# Patient Record
Sex: MALE | Race: WHITE | NOT HISPANIC OR LATINO | Employment: PART TIME | URBAN - METROPOLITAN AREA
[De-identification: names, ages, dates, MRNs, and addresses within clinical notes are randomized per-mention and may not be internally consistent; named-entity substitution may affect disease eponyms.]

---

## 2024-12-07 ENCOUNTER — APPOINTMENT (OUTPATIENT)
Dept: URGENT CARE | Age: 19
End: 2024-12-07
Payer: MEDICAID

## 2024-12-07 ENCOUNTER — OFFICE VISIT (OUTPATIENT)
Dept: URGENT CARE | Age: 19
End: 2024-12-07
Payer: MEDICAID

## 2024-12-07 VITALS
HEIGHT: 76 IN | WEIGHT: 200 LBS | HEART RATE: 95 BPM | SYSTOLIC BLOOD PRESSURE: 119 MMHG | OXYGEN SATURATION: 97 % | BODY MASS INDEX: 24.36 KG/M2 | TEMPERATURE: 102.5 F | RESPIRATION RATE: 18 BRPM | DIASTOLIC BLOOD PRESSURE: 66 MMHG

## 2024-12-07 DIAGNOSIS — J01.10 ACUTE FRONTAL SINUSITIS, RECURRENCE NOT SPECIFIED: Primary | ICD-10-CM

## 2024-12-07 DIAGNOSIS — R69 ILLNESS: ICD-10-CM

## 2024-12-07 LAB
POC RAPID INFLUENZA A: NEGATIVE
POC RAPID INFLUENZA B: NEGATIVE
POC SARS-COV-2 AG BINAX: NORMAL

## 2024-12-07 RX ORDER — CEFDINIR 300 MG/1
300 CAPSULE ORAL 2 TIMES DAILY
Qty: 20 CAPSULE | Refills: 0 | Status: SHIPPED | OUTPATIENT
Start: 2024-12-07 | End: 2024-12-17

## 2024-12-07 ASSESSMENT — ENCOUNTER SYMPTOMS
SORE THROAT: 0
WHEEZING: 0
VOMITING: 1
SINUS PRESSURE: 1
SHORTNESS OF BREATH: 0
EYE DISCHARGE: 0
CHEST TIGHTNESS: 0
EYE PAIN: 0
COUGH: 0
CHILLS: 1
EYE REDNESS: 0
FEVER: 1
SINUS PAIN: 1
HEADACHES: 1
NAUSEA: 1

## 2024-12-07 NOTE — PROGRESS NOTES
Subjective   Patient ID: Tahir Ritter is a 19 y.o. male. They present today with a chief complaint of Illness (Going on x3 days ), Headache, Sinusitis, Vomiting, Fever, Earache, Chills, and Lethargy.    History of Present Illness    History provided by:  Patient   used: No    Illness  Location:  Frontal and Sphenoid sinuses  Quality:  Ache  Severity:  Severe (8/10)  Duration:  4 days  Timing:  Constant  Progression:  Worsening  Chronicity:  New  Relieved by:  Ibuprofen  Associated symptoms: ear pain, fever, headaches, nausea and vomiting    Associated symptoms: no chest pain, no congestion, no cough, no shortness of breath, no sore throat and no wheezing    Headache  Associated symptoms: ear pain, fever, nausea, sinus pressure and vomiting    Associated symptoms: no congestion, no cough, no eye pain and no sore throat    Sinusitis  Associated symptoms: chills, ear pain, fever, headaches, nausea and vomiting    Associated symptoms: no chest pain, no congestion, no cough, no shortness of breath, no sore throat and no wheezing    Vomiting  Associated symptoms: chills, fever and headaches    Associated symptoms: no cough and no sore throat    Fever   Associated symptoms include ear pain, headaches, nausea and vomiting. Pertinent negatives include no chest pain, congestion, coughing, sore throat or wheezing.   Earache   Associated symptoms include headaches and vomiting. Pertinent negatives include no coughing or sore throat.       Past Medical History  Allergies as of 12/07/2024    (No Known Allergies)       (Not in a hospital admission)       History reviewed. No pertinent past medical history.    History reviewed. No pertinent surgical history.     reports that he has never smoked. He has never used smokeless tobacco.    Review of Systems  Review of Systems   Constitutional:  Positive for chills and fever.   HENT:  Positive for ear pain, sinus pressure and sinus pain. Negative for congestion and  "sore throat.         Ear blockage   Eyes:  Negative for pain, discharge and redness.   Respiratory:  Negative for cough, chest tightness, shortness of breath and wheezing.    Cardiovascular:  Negative for chest pain.   Gastrointestinal:  Positive for nausea and vomiting.   Neurological:  Positive for headaches.                                  Objective    Vitals:    12/07/24 1151   BP: 119/66   BP Location: Left arm   Patient Position: Sitting   Pulse: 95   Resp: 18   Temp: (!) 39.2 °C (102.5 °F)   TempSrc: Oral   SpO2: 97%   Weight: 90.7 kg (200 lb)   Height: 1.93 m (6' 4\")     No LMP for male patient.    Physical Exam  Vitals reviewed.   Constitutional:       General: He is not in acute distress.     Appearance: He is normal weight.   HENT:      Head: Atraumatic.      Right Ear: Tympanic membrane and ear canal normal.      Left Ear: Tympanic membrane and ear canal normal.      Nose:      Right Sinus: Frontal sinus tenderness present. No maxillary sinus tenderness.      Left Sinus: Frontal sinus tenderness present. No maxillary sinus tenderness.      Mouth/Throat:      Mouth: Mucous membranes are moist.      Pharynx: No posterior oropharyngeal erythema.   Eyes:      Extraocular Movements: Extraocular movements intact.      Pupils: Pupils are equal, round, and reactive to light.   Cardiovascular:      Rate and Rhythm: Normal rate and regular rhythm.      Heart sounds: No murmur heard.     No friction rub.   Pulmonary:      Effort: Pulmonary effort is normal. No respiratory distress.      Breath sounds: No wheezing, rhonchi or rales.   Musculoskeletal:      Cervical back: No rigidity or tenderness.   Lymphadenopathy:      Cervical: No cervical adenopathy.   Neurological:      Mental Status: He is alert.         Procedures    Point of Care Test & Imaging Results from this visit  Results for orders placed or performed in visit on 12/07/24   POCT Influenza A/B manually resulted   Result Value Ref Range    POC Rapid " Influenza A Negative Negative    POC Rapid Influenza B Negative Negative   POCT Covid-19 Rapid Antigen   Result Value Ref Range    POC MACRINA-COV-2 AG  Presumptive negative test for SARS-CoV-2 (no antigen detected)     Presumptive negative test for SARS-CoV-2 (no antigen detected)      No results found.    Diagnostic study results (if any) were reviewed by Alex Anton DO.    Assessment/Plan   Allergies, medications, history, and pertinent labs/EKGs/Imaging reviewed by Alex Anton DO.     Orders and Diagnoses  Diagnoses and all orders for this visit:  Illness  -     POCT Influenza A/B manually resulted  -     POCT Covid-19 Rapid Antigen      Medical Admin Record      Patient disposition: Home    Electronically signed by Alex Anton DO  12:02 PM

## 2025-05-12 ENCOUNTER — APPOINTMENT (OUTPATIENT)
Dept: RADIOLOGY | Facility: HOSPITAL | Age: 20
End: 2025-05-12
Payer: COMMERCIAL

## 2025-05-12 ENCOUNTER — HOSPITAL ENCOUNTER (EMERGENCY)
Facility: HOSPITAL | Age: 20
Discharge: HOME | End: 2025-05-12
Payer: COMMERCIAL

## 2025-05-12 VITALS
SYSTOLIC BLOOD PRESSURE: 155 MMHG | RESPIRATION RATE: 16 BRPM | OXYGEN SATURATION: 98 % | DIASTOLIC BLOOD PRESSURE: 93 MMHG | HEIGHT: 76 IN | BODY MASS INDEX: 23.75 KG/M2 | TEMPERATURE: 97.5 F | HEART RATE: 91 BPM | WEIGHT: 195 LBS
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
## 2025-05-12 PROCEDURE — 99284 EMERGENCY DEPT VISIT MOD MDM: CPT | Mod: 25

## 2025-05-12 PROCEDURE — 72125 CT NECK SPINE W/O DYE: CPT

## 2025-05-12 PROCEDURE — 70450 CT HEAD/BRAIN W/O DYE: CPT

## 2025-05-12 PROCEDURE — 2500000004 HC RX 250 GENERAL PHARMACY W/ HCPCS (ALT 636 FOR OP/ED)

## 2025-05-12 RX ORDER — ONDANSETRON 4 MG/1
4 TABLET, FILM COATED ORAL EVERY 6 HOURS
Qty: 12 TABLET | Refills: 0 | Status: SHIPPED | OUTPATIENT
Start: 2025-05-12 | End: 2025-05-15

## 2025-05-12 RX ORDER — ONDANSETRON 4 MG/1
4 TABLET, ORALLY DISINTEGRATING ORAL ONCE
Status: COMPLETED | OUTPATIENT
Start: 2025-05-12 | End: 2025-05-12

## 2025-05-12 RX ORDER — ONDANSETRON 4 MG/1
4 TABLET, FILM COATED ORAL EVERY 6 HOURS
Qty: 12 TABLET | Refills: 0 | Status: SHIPPED | OUTPATIENT
Start: 2025-05-12 | End: 2025-05-12

## 2025-05-12 RX ADMIN — ONDANSETRON 4 MG: 4 TABLET, ORALLY DISINTEGRATING ORAL at 16:38

## 2025-05-12 ASSESSMENT — LIFESTYLE VARIABLES
TOTAL SCORE: 0
HAVE PEOPLE ANNOYED YOU BY CRITICIZING YOUR DRINKING: NO
EVER FELT BAD OR GUILTY ABOUT YOUR DRINKING: NO
HAVE YOU EVER FELT YOU SHOULD CUT DOWN ON YOUR DRINKING: NO
EVER HAD A DRINK FIRST THING IN THE MORNING TO STEADY YOUR NERVES TO GET RID OF A HANGOVER: NO

## 2025-05-12 ASSESSMENT — COLUMBIA-SUICIDE SEVERITY RATING SCALE - C-SSRS
6. HAVE YOU EVER DONE ANYTHING, STARTED TO DO ANYTHING, OR PREPARED TO DO ANYTHING TO END YOUR LIFE?: NO
2. HAVE YOU ACTUALLY HAD ANY THOUGHTS OF KILLING YOURSELF?: NO
1. IN THE PAST MONTH, HAVE YOU WISHED YOU WERE DEAD OR WISHED YOU COULD GO TO SLEEP AND NOT WAKE UP?: NO

## 2025-05-12 ASSESSMENT — PAIN - FUNCTIONAL ASSESSMENT: PAIN_FUNCTIONAL_ASSESSMENT: 0-10

## 2025-05-12 ASSESSMENT — PAIN SCALES - GENERAL: PAINLEVEL_OUTOF10: 4

## 2025-05-12 NOTE — ED PROVIDER NOTES
HPI   Chief Complaint   Patient presents with    Fall       19-year-old male no pertinent past medical history reports emergency department for chief complaint of fall that occurred 2 days ago.  Patient states that he was drinking alcohol and accidentally tripped outside hitting his head on the concrete.  Does not remember the incident.  Reports persistent headache, generalized fatigue as well as that his eyes are red.  Does report 1 episode of emesis after this happened.  Denies any anticoagulation use, back pain, paresthesias, loss of bowel or bladder, abdominal pain.  Denies any pertinent past medical history.              Patient History   Medical History[1]  Surgical History[2]  Family History[3]  Social History[4]    Physical Exam   ED Triage Vitals [05/12/25 1607]   Temperature Heart Rate Respirations BP   36.4 °C (97.5 °F) 91 16 (!) 155/93      Pulse Ox Temp src Heart Rate Source Patient Position   98 % -- -- --      BP Location FiO2 (%)     -- --       Physical Exam  Vitals and nursing note reviewed.   Constitutional:       General: He is not in acute distress.     Appearance: Normal appearance. He is normal weight. He is not ill-appearing, toxic-appearing or diaphoretic.   HENT:      Head: Normocephalic and atraumatic.      Comments: No TTP to the bilateral maxillary areas, jaw, nose.  No appreciable deformities.  No facial pain.     Mouth/Throat:      Mouth: Mucous membranes are moist.      Pharynx: Oropharynx is clear. No oropharyngeal exudate or posterior oropharyngeal erythema.   Eyes:      General:         Right eye: No discharge.         Left eye: No discharge.      Extraocular Movements: Extraocular movements intact.      Conjunctiva/sclera: Conjunctivae normal.      Pupils: Pupils are equal, round, and reactive to light.   Cardiovascular:      Rate and Rhythm: Normal rate and regular rhythm.      Heart sounds: Normal heart sounds. No murmur heard.     No friction rub. No gallop.   Pulmonary:       Effort: Pulmonary effort is normal. No respiratory distress.      Breath sounds: Normal breath sounds. No stridor. No wheezing, rhonchi or rales.   Musculoskeletal:      Comments: Well-healing abrasion to the left forehead area.  No obvious surrounding erythema, edema, purulence or signs of infectious process.    No T or L-spine tenderness in the midline regions on palpation.  Mild midline C-spine and bilateral paraspinal muscle tenderness to palpation.   Neurological:      General: No focal deficit present.      Mental Status: He is alert and oriented to person, place, and time.      Sensory: No sensory deficit.      Motor: No weakness.      Gait: Gait normal.           ED Course & MDM   Diagnoses as of 05/12/25 1747   Fall, initial encounter                 No data recorded     [place] Coma Scale Score: 15 (05/12/25 1605 : Clementine Aviles RN)                           Medical Decision Making  19-year-old male presents to pursue department status post fall that occurred a couple days ago.  Patient reports he was drinking alcohol and tripped outside of the concrete and hitting his head.  States he does not remember this incident very well.  Reports persistent headache since then.  No focal neurologic deficits on exam.  Patient overall well-appearing at this time.  Reassuring vital signs in the emergency department.  Patient on exam complains of headache as well as some mild midline C-spine tenderness.  With this in mind we will [place] imaging of these areas to rule out acute intracranial abnormality or osseous involvement.    5:48 PM CT of the head and C-spine both negative.  Advised patient of these findings here today.  Patient was given referral to concussion clinic for further management evaluation.  Also can prescriptions for antiemetics given patient's reported nausea.  Spoke with the patient regarding symptomatic management moving forward as well as signs symptoms of return.  Patient did have opportunity to  ask questions and admits within no further complaints at this time and was amenable plan moving forward for discharge.        Procedure  Procedures         [1] No past medical history on file.  [2] No past surgical history on file.  [3] No family history on file.  [4]   Social History  Tobacco Use    Smoking status: Never    Smokeless tobacco: Never   Substance Use Topics    Alcohol use: Not on file    Drug use: Not on file        Janak Chinchilla PA-C  05/12/25 3496

## 2025-05-12 NOTE — Clinical Note
Tahir Grimmmann was seen and treated in our emergency department on 5/12/2025.  He may return to work on 05/15/2025.       If you have any questions or concerns, please don't hesitate to call.      Janak Chinchilla PA-C

## 2025-05-12 NOTE — DISCHARGE INSTRUCTIONS
[facility]  [address] [place] [address]  [phone]

## 2025-05-12 NOTE — ED TRIAGE NOTES
Pt states he had a mechanical fall 2 days ago and hit his head. No thinners, states he doesn't remember the incident. Now having HA, feels tired, and that his eyes are red

## 2025-06-24 ENCOUNTER — HOSPITAL ENCOUNTER (EMERGENCY)
Facility: HOSPITAL | Age: 20
Discharge: LEFT BEFORE DISCHARGE | End: 2025-06-25
Payer: COMMERCIAL

## 2025-06-24 PROCEDURE — 4500999001 HC ED NO CHARGE

## 2025-06-25 ENCOUNTER — APPOINTMENT (OUTPATIENT)
Dept: RADIOLOGY | Facility: HOSPITAL | Age: 20
End: 2025-06-25
Payer: COMMERCIAL

## 2025-06-25 ENCOUNTER — HOSPITAL ENCOUNTER (EMERGENCY)
Facility: HOSPITAL | Age: 20
Discharge: HOME | End: 2025-06-25
Attending: EMERGENCY MEDICINE
Payer: COMMERCIAL

## 2025-06-25 VITALS
HEIGHT: 76 IN | TEMPERATURE: 97.9 F | OXYGEN SATURATION: 98 % | DIASTOLIC BLOOD PRESSURE: 85 MMHG | RESPIRATION RATE: 15 BRPM | SYSTOLIC BLOOD PRESSURE: 149 MMHG | HEART RATE: 74 BPM | BODY MASS INDEX: 23.74 KG/M2

## 2025-06-25 DIAGNOSIS — R10.9 FLANK PAIN: Primary | ICD-10-CM

## 2025-06-25 LAB
ALBUMIN SERPL BCP-MCNC: 5.2 G/DL (ref 3.4–5)
ALP SERPL-CCNC: 64 U/L (ref 33–120)
ALT SERPL W P-5'-P-CCNC: 24 U/L (ref 10–52)
ANION GAP SERPL CALC-SCNC: 12 MMOL/L (ref 10–20)
APPEARANCE UR: CLEAR
AST SERPL W P-5'-P-CCNC: 25 U/L (ref 9–39)
BASOPHILS # BLD AUTO: 0.03 X10*3/UL (ref 0–0.1)
BASOPHILS NFR BLD AUTO: 0.4 %
BILIRUB SERPL-MCNC: 1 MG/DL (ref 0–1.2)
BILIRUB UR STRIP.AUTO-MCNC: NEGATIVE MG/DL
BUN SERPL-MCNC: 10 MG/DL (ref 6–23)
CALCIUM SERPL-MCNC: 10.2 MG/DL (ref 8.6–10.6)
CHLORIDE SERPL-SCNC: 101 MMOL/L (ref 98–107)
CO2 SERPL-SCNC: 29 MMOL/L (ref 21–32)
COLOR UR: NORMAL
CREAT SERPL-MCNC: 1.1 MG/DL (ref 0.5–1.3)
EGFRCR SERPLBLD CKD-EPI 2021: >90 ML/MIN/1.73M*2
EOSINOPHIL # BLD AUTO: 0.57 X10*3/UL (ref 0–0.7)
EOSINOPHIL NFR BLD AUTO: 8.5 %
ERYTHROCYTE [DISTWIDTH] IN BLOOD BY AUTOMATED COUNT: 12 % (ref 11.5–14.5)
GLUCOSE SERPL-MCNC: 86 MG/DL (ref 74–99)
GLUCOSE UR STRIP.AUTO-MCNC: NORMAL MG/DL
HCT VFR BLD AUTO: 45.6 % (ref 41–52)
HGB BLD-MCNC: 15.9 G/DL (ref 13.5–17.5)
IMM GRANULOCYTES # BLD AUTO: 0.01 X10*3/UL (ref 0–0.7)
IMM GRANULOCYTES NFR BLD AUTO: 0.1 % (ref 0–0.9)
KETONES UR STRIP.AUTO-MCNC: NEGATIVE MG/DL
LEUKOCYTE ESTERASE UR QL STRIP.AUTO: NEGATIVE
LYMPHOCYTES # BLD AUTO: 1.53 X10*3/UL (ref 1.2–4.8)
LYMPHOCYTES NFR BLD AUTO: 22.8 %
MCH RBC QN AUTO: 29.6 PG (ref 26–34)
MCHC RBC AUTO-ENTMCNC: 34.9 G/DL (ref 32–36)
MCV RBC AUTO: 85 FL (ref 80–100)
MONOCYTES # BLD AUTO: 0.72 X10*3/UL (ref 0.1–1)
MONOCYTES NFR BLD AUTO: 10.7 %
NEUTROPHILS # BLD AUTO: 3.85 X10*3/UL (ref 1.2–7.7)
NEUTROPHILS NFR BLD AUTO: 57.5 %
NITRITE UR QL STRIP.AUTO: NEGATIVE
NRBC BLD-RTO: 0 /100 WBCS (ref 0–0)
PH UR STRIP.AUTO: 6.5 [PH]
PLATELET # BLD AUTO: 290 X10*3/UL (ref 150–450)
POTASSIUM SERPL-SCNC: 4 MMOL/L (ref 3.5–5.3)
PROT SERPL-MCNC: 8.2 G/DL (ref 6.4–8.2)
PROT UR STRIP.AUTO-MCNC: NEGATIVE MG/DL
RBC # BLD AUTO: 5.37 X10*6/UL (ref 4.5–5.9)
RBC # UR STRIP.AUTO: NEGATIVE MG/DL
SODIUM SERPL-SCNC: 138 MMOL/L (ref 136–145)
SP GR UR STRIP.AUTO: 1.01
UROBILINOGEN UR STRIP.AUTO-MCNC: NORMAL MG/DL
WBC # BLD AUTO: 6.7 X10*3/UL (ref 4.4–11.3)

## 2025-06-25 PROCEDURE — 85025 COMPLETE CBC W/AUTO DIFF WBC: CPT | Performed by: EMERGENCY MEDICINE

## 2025-06-25 PROCEDURE — 71046 X-RAY EXAM CHEST 2 VIEWS: CPT | Performed by: RADIOLOGY

## 2025-06-25 PROCEDURE — 80053 COMPREHEN METABOLIC PANEL: CPT | Performed by: EMERGENCY MEDICINE

## 2025-06-25 PROCEDURE — 74177 CT ABD & PELVIS W/CONTRAST: CPT

## 2025-06-25 PROCEDURE — 81003 URINALYSIS AUTO W/O SCOPE: CPT | Performed by: EMERGENCY MEDICINE

## 2025-06-25 PROCEDURE — 71046 X-RAY EXAM CHEST 2 VIEWS: CPT

## 2025-06-25 PROCEDURE — 36415 COLL VENOUS BLD VENIPUNCTURE: CPT | Performed by: EMERGENCY MEDICINE

## 2025-06-25 PROCEDURE — 2550000001 HC RX 255 CONTRASTS: Performed by: EMERGENCY MEDICINE

## 2025-06-25 PROCEDURE — 96374 THER/PROPH/DIAG INJ IV PUSH: CPT | Mod: 59

## 2025-06-25 PROCEDURE — 74177 CT ABD & PELVIS W/CONTRAST: CPT | Performed by: STUDENT IN AN ORGANIZED HEALTH CARE EDUCATION/TRAINING PROGRAM

## 2025-06-25 PROCEDURE — 2500000005 HC RX 250 GENERAL PHARMACY W/O HCPCS: Performed by: EMERGENCY MEDICINE

## 2025-06-25 PROCEDURE — 2500000004 HC RX 250 GENERAL PHARMACY W/ HCPCS (ALT 636 FOR OP/ED): Mod: JZ,TB | Performed by: EMERGENCY MEDICINE

## 2025-06-25 PROCEDURE — 99285 EMERGENCY DEPT VISIT HI MDM: CPT | Mod: 25 | Performed by: EMERGENCY MEDICINE

## 2025-06-25 RX ORDER — LIDOCAINE 560 MG/1
1 PATCH PERCUTANEOUS; TOPICAL; TRANSDERMAL ONCE
Status: DISCONTINUED | OUTPATIENT
Start: 2025-06-25 | End: 2025-06-25 | Stop reason: HOSPADM

## 2025-06-25 RX ORDER — KETOROLAC TROMETHAMINE 15 MG/ML
15 INJECTION, SOLUTION INTRAMUSCULAR; INTRAVENOUS ONCE
Status: COMPLETED | OUTPATIENT
Start: 2025-06-25 | End: 2025-06-25

## 2025-06-25 RX ORDER — LIDOCAINE AND MENTHOL 40; 40 MG/1; MG/1
1 PATCH TOPICAL EVERY 12 HOURS PRN
Qty: 10 PATCH | Refills: 0 | Status: SHIPPED | OUTPATIENT
Start: 2025-06-25

## 2025-06-25 RX ORDER — IBUPROFEN 600 MG/1
600 TABLET, FILM COATED ORAL EVERY 6 HOURS PRN
Qty: 28 TABLET | Refills: 0 | Status: SHIPPED | OUTPATIENT
Start: 2025-06-25 | End: 2025-06-25

## 2025-06-25 RX ORDER — IBUPROFEN 600 MG/1
600 TABLET, FILM COATED ORAL EVERY 6 HOURS PRN
Qty: 28 TABLET | Refills: 0 | Status: SHIPPED | OUTPATIENT
Start: 2025-06-25 | End: 2025-07-02

## 2025-06-25 RX ORDER — LIDOCAINE AND MENTHOL 40; 40 MG/1; MG/1
1 PATCH TOPICAL EVERY 12 HOURS PRN
Qty: 10 PATCH | Refills: 0 | Status: SHIPPED | OUTPATIENT
Start: 2025-06-25 | End: 2025-06-25

## 2025-06-25 RX ADMIN — IOHEXOL 90 ML: 350 INJECTION, SOLUTION INTRAVENOUS at 12:39

## 2025-06-25 RX ADMIN — KETOROLAC TROMETHAMINE 15 MG: 15 INJECTION, SOLUTION INTRAMUSCULAR; INTRAVENOUS at 11:10

## 2025-06-25 RX ADMIN — LIDOCAINE 4% 1 PATCH: 40 PATCH TOPICAL at 11:10

## 2025-06-25 ASSESSMENT — LIFESTYLE VARIABLES
HAVE PEOPLE ANNOYED YOU BY CRITICIZING YOUR DRINKING: NO
HAVE YOU EVER FELT YOU SHOULD CUT DOWN ON YOUR DRINKING: NO
TOTAL SCORE: 0
EVER FELT BAD OR GUILTY ABOUT YOUR DRINKING: NO
EVER HAD A DRINK FIRST THING IN THE MORNING TO STEADY YOUR NERVES TO GET RID OF A HANGOVER: NO

## 2025-06-25 ASSESSMENT — PAIN SCALES - GENERAL: PAINLEVEL_OUTOF10: 5 - MODERATE PAIN

## 2025-06-25 ASSESSMENT — PAIN - FUNCTIONAL ASSESSMENT: PAIN_FUNCTIONAL_ASSESSMENT: 0-10

## 2025-06-25 NOTE — DISCHARGE INSTRUCTIONS
All of your imaging does not show any acute rib fractures or injuries to your kidney, I do think you have some pretty significant rib rib bruising you should use the medications prescribed if needed.

## 2025-06-25 NOTE — ED TRIAGE NOTES
Pt presents to ED with concern for right side rib pain that started yesterday. Pt states it is painful to cough and turn. Pt states he was brittani jumping on Sunday and fell about 10 feet on to sand. Denies hitting head or LOC from this incident. Denies significant medical history. Denies use of home medications. Denies other concerns. Patient is well presenting and able to ambulate per self.

## 2025-06-25 NOTE — Clinical Note
Tahir Riya was seen and treated in our emergency department on 6/25/2025.  He may return to work on 06/26/2025.       If you have any questions or concerns, please don't hesitate to call.      Santos Medrano MD

## 2025-06-25 NOTE — ED PROVIDER NOTES
"Limitations to History: None     HPI:      Tahir Ritter is a 19 y.o. who presents to the ED with right sided rib and flank pain.  The patient fell off a 10 foot rock onto sand while climbing on Sunday.  Said it did not hurt that bad and he did not have significant pain until yesterday when he started having a lot of pain with difficulty taking deep breaths and twisting.  It is in the right lower posterior part of his chest.  No coughing, no difficulty urinating or blood in his urination.  No vomiting.  He took ibuprofen without relief yesterday.    ------------------------------------------------------------------------------------------------------------------------------------------    VS: /85   Pulse 74   Temp 36.6 °C (97.9 °F)   Resp 15   Ht 1.93 m (6' 4\")   SpO2 98%   BMI 23.74 kg/m²     Physical Exam:  Gen: Alert, NAD  Head/Neck: NCAT, neck w/ FROM  Eyes: EOMI, PERRL, anicteric sclerae, noninjected conjunctivae  Mouth:  MMM, no OP lesions noted  Heart: RRR no MRG  Lungs: CTA b/l no RRW, no increased work of breathing  Abdomen: soft, NT, ND, no HSM, no palpable masses, right lower chest tenderness to palpation in the posterior aspect, some right flank tenderness to palpation without active bruising  Musculoskeletal: no joint swelling noted  Extremities: WWP, no c/c/e, cap refill <2sec  Neurologic: Alert, symmetrical facies, phonates clearly, moves all extremities equally, responsive to touch, ambulates normally   Skin: no rashes noted  Psychological: calm, no SI/HI      ------------------------------------------------------------------------------------------------------------------------------------------    Medical Decision Making: Patient presents emerged from after a fall few days ago.  I am concerned about a retroperitoneal bleed given the fact the patient had delayed pain and has a pretty significant flank tenderness to palpation.  Chest x-ray does not show any rib fractures or hemothorax so a " CT scan was obtained which does not show any acute injury.  Lidocaine and ibuprofen were prescribed and the patient will be discharged with those as well for rib bruising.    Diagnoses as of 06/25/25 1436   Flank pain       Medications   lidocaine 4 % patch 1 patch (1 patch transdermal Medication Applied 6/25/25 1110)   ketorolac (Toradol) injection 15 mg (15 mg intravenous Given 6/25/25 1110)          Santos Medrano MD  06/25/25 1438

## 2025-06-26 LAB — HOLD SPECIMEN: NORMAL
